# Patient Record
Sex: MALE | Race: WHITE | Employment: FULL TIME | URBAN - METROPOLITAN AREA
[De-identification: names, ages, dates, MRNs, and addresses within clinical notes are randomized per-mention and may not be internally consistent; named-entity substitution may affect disease eponyms.]

---

## 2019-04-11 ENCOUNTER — ORDER TRANSCRIPTION (OUTPATIENT)
Dept: SLEEP CENTER | Age: 49
End: 2019-04-11

## 2019-04-11 DIAGNOSIS — G47.33 OSA (OBSTRUCTIVE SLEEP APNEA): Primary | ICD-10-CM

## 2019-05-20 ENCOUNTER — OFFICE VISIT (OUTPATIENT)
Dept: SLEEP CENTER | Age: 49
End: 2019-05-20
Attending: FAMILY MEDICINE
Payer: COMMERCIAL

## 2019-05-20 DIAGNOSIS — G47.33 OSA (OBSTRUCTIVE SLEEP APNEA): Primary | ICD-10-CM

## 2019-05-20 PROCEDURE — 95806 SLEEP STUDY UNATT&RESP EFFT: CPT

## 2019-05-24 NOTE — PROCEDURES
[address] [facility]  Accredited by the [employer] ([employer])    PATIENT'S NAME: Kiki Mooney   ATTENDING PHYSICIAN: Imelda Burns. Durand Sandifer, MD   REFERRING PHYSICIAN: Imelda Burns.  Durand Sandifer, MD   PATIENT ACCOUNT #: [de-identified] LOCATION: Sleep

## 2022-08-10 ENCOUNTER — APPOINTMENT (OUTPATIENT)
Dept: GENERAL RADIOLOGY | Age: 52
End: 2022-08-10
Attending: NURSE PRACTITIONER
Payer: COMMERCIAL

## 2022-08-10 ENCOUNTER — HOSPITAL ENCOUNTER (OUTPATIENT)
Age: 52
Discharge: HOME | End: 2022-08-10
Payer: COMMERCIAL

## 2022-08-10 VITALS
SYSTOLIC BLOOD PRESSURE: 138 MMHG | DIASTOLIC BLOOD PRESSURE: 82 MMHG | HEART RATE: 73 BPM | TEMPERATURE: 97 F | OXYGEN SATURATION: 99 % | RESPIRATION RATE: 20 BRPM

## 2022-08-10 DIAGNOSIS — M79.672 PAIN IN BOTH FEET: Primary | ICD-10-CM

## 2022-08-10 DIAGNOSIS — M79.671 PAIN IN BOTH FEET: Primary | ICD-10-CM

## 2022-08-10 DIAGNOSIS — M25.476 SOFT TISSUE SWELLING OF JOINT OF TOE: ICD-10-CM

## 2022-08-10 PROCEDURE — 73660 X-RAY EXAM OF TOE(S): CPT | Performed by: NURSE PRACTITIONER

## 2022-08-10 PROCEDURE — 99213 OFFICE O/P EST LOW 20 MIN: CPT

## 2022-08-10 RX ORDER — PREDNISONE 20 MG/1
40 TABLET ORAL DAILY
Qty: 10 TABLET | Refills: 0 | Status: SHIPPED | OUTPATIENT
Start: 2022-08-10 | End: 2022-08-15

## 2022-08-10 RX ORDER — INDOMETHACIN 50 MG/1
1 CAPSULE ORAL AS DIRECTED
COMMUNITY

## 2022-08-10 NOTE — ED INITIAL ASSESSMENT (HPI)
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 States he has noted increased bilateral foot pain over the last few weeks. Had soreness to the base of his right great toe, now with redness and swelling today. Denies significant trauma/injury. Hx of gout. Took 2 doses of indomethacin today without improvement in symptoms.